# Patient Record
Sex: MALE | URBAN - METROPOLITAN AREA
[De-identification: names, ages, dates, MRNs, and addresses within clinical notes are randomized per-mention and may not be internally consistent; named-entity substitution may affect disease eponyms.]

---

## 2022-09-28 ENCOUNTER — TELEPHONE ENCOUNTER (OUTPATIENT)
Dept: URBAN - METROPOLITAN AREA CLINIC 36 | Facility: CLINIC | Age: 62
End: 2022-09-28

## 2022-09-29 ENCOUNTER — OFFICE VISIT (OUTPATIENT)
Dept: URBAN - METROPOLITAN AREA CLINIC 33 | Facility: CLINIC | Age: 62
End: 2022-09-29
Payer: COMMERCIAL

## 2022-09-29 VITALS
BODY MASS INDEX: 32 KG/M2 | SYSTOLIC BLOOD PRESSURE: 125 MMHG | WEIGHT: 228.6 LBS | HEART RATE: 58 BPM | DIASTOLIC BLOOD PRESSURE: 83 MMHG | HEIGHT: 71 IN | OXYGEN SATURATION: 98 %

## 2022-09-29 DIAGNOSIS — R74.01 ELEVATION OF LEVELS OF LIVER TRANSAMINASE LEVELS: ICD-10-CM

## 2022-09-29 DIAGNOSIS — Z86.010 PERSONAL HISTORY OF COLONIC POLYPS: ICD-10-CM

## 2022-09-29 DIAGNOSIS — R13.19 CERVICAL DYSPHAGIA: ICD-10-CM

## 2022-09-29 PROBLEM — 863927004 LIVER FUNCTION TEST INCREASED (FINDING: Status: ACTIVE | Noted: 2022-09-29

## 2022-09-29 PROCEDURE — 99204 OFFICE O/P NEW MOD 45 MIN: CPT | Performed by: INTERNAL MEDICINE

## 2022-09-29 PROCEDURE — 99254 IP/OBS CNSLTJ NEW/EST MOD 60: CPT | Performed by: INTERNAL MEDICINE

## 2022-09-29 RX ORDER — PANTOPRAZOLE SODIUM 40 MG/1
1 TABLET TABLET, DELAYED RELEASE ORAL ONCE A DAY
Status: ACTIVE | COMMUNITY

## 2022-09-29 RX ORDER — NAPHAZOLINE HCL 0.012 %
2 TABLETS DROPS OPHTHALMIC (EYE) ONCE A DAY
Status: ACTIVE | COMMUNITY

## 2022-09-29 RX ORDER — GUSELKUMAB 100 MG/ML
AS DIRECTED INJECTION SUBCUTANEOUS
Status: ACTIVE | COMMUNITY

## 2022-09-29 RX ORDER — LEVOTHYROXINE SODIUM 125 UG/1
1 TABLET IN THE MORNING ON AN EMPTY STOMACH TABLET ORAL ONCE A DAY
Status: ACTIVE | COMMUNITY

## 2022-09-29 NOTE — HPI-SURVEILLANCE COLONOSCOPY
61 year old male patient whom presents today for consultation for a surveillance colonoscopy. He has a personal history of colonic polyps and his ast colonoscopy was 06.2021 by Dr. Farley. Patient states biospies showed Adenomatous Polyp.  Currently, having 1 bowel movement a day. Stools varies bewteen hard/normal without the presence of blood, mucus, and melena. Patient denies family history of colon cancer/polyps.

## 2022-09-29 NOTE — HPI-DYSPHAGIA
Patient presents today for consultation about dysphagia.  Onset started about 7yrs ago and episodes occur when taking larger pill.  Dysphagia occurs only with larger medication pills, he able to swallow solids food and liquids without any issues. Patient admits having previous EGD or Barium Swallow completed.   He admits having a hernia.  Denies a family history of esophageal and gastric cancers and/or diseases. .

## 2022-09-29 NOTE — PHYSICAL EXAM GASTROINTESTINAL
Abdomen , soft, nontender, nondistended , no guarding or rigidity , no masses palpable , normal bowel sounds , Liver and Spleen,  no hepatosplenomegaly , liver nontender. Ventral hernia

## 2022-09-29 NOTE — HPI-ELEVATED LIVER ENZYMES
61 year old male patient presents today for consultation about recent elevated liver enzymes. Patient admits a history of elevated liver enzymes. Most recent labs were performed on 05.10.2022, ordered by Dr. Salguero, with findings of: Alk Phosphatase: 183 and ALT: 63 all other liver enzymes were in normal range.   He had labs completed by Dr. Howe back in 01.22.2021 which normal range of his liver enzymes.  Patient currently denies any symptoms of jaundice, chills, RUQ palins, dizziness, easy bruising, fatigue.   RISK FACTORS: Denies: Drugs, travel outside the [place], NSAIDs, protein supplements, tattoos, piercings,  service, blood transfusion, family history of liver disease or cancer, personal exposure to liver diseases, penitentiary, rehab. He drinks socially

## 2022-10-15 LAB
A/G RATIO: 1.5
ACTIN (SMOOTH MUSCLE) ANTIBODY (IGG): <20
ALBUMIN: 4.1
ALKALINE PHOSPHATASE: 137
ALPHA-1-ANTITRYPSIN (AAT) PHENOTYPE: (no result)
ALT (SGPT): 32
ANA SCREEN, IFA: NEGATIVE
AST (SGOT): 22
BILIRUBIN, TOTAL: 0.9
BUN/CREATININE RATIO: (no result)
BUN: 18
CALCIUM: 10.4
CARBON DIOXIDE, TOTAL: 21
CERULOPLASMIN: 24
CHLORIDE: 110
CREATININE: 0.92
EGFR: 96
FERRITIN, SERUM: 89
GLOBULIN, TOTAL: 2.8
GLUCOSE: 85
HEP A AB, IGM: (no result)
HEP B CORE AB, IGM: (no result)
HEPATITIS A AB, TOTAL: (no result)
HEPATITIS B CORE AB TOTAL: (no result)
HEPATITIS B SURFACE AB IMMUNITY, QN: <5
HEPATITIS B SURFACE ANTIGEN: (no result)
HEPATITIS C ANTIBODY: (no result)
HEREDITARY HEMOCHROMATOSIS DNA MUT: (no result)
IMMUNOGLOBULIN A: 398
IMMUNOGLOBULIN G: 1139
IMMUNOGLOBULIN M: 53
INDEX: 0.02
IRON BIND.CAP.(TIBC): 344
IRON SATURATION: 42
IRON: 145
LKM-1 ANTIBODY (IGG): <=20
MITOCHONDRIAL (M2) ANTIBODY: <=20
POTASSIUM: 4.4
PROTEIN, TOTAL: 6.9
SODIUM: 142
T-TRANSGLUTAMINASE (TTG) IGA: <1

## 2022-10-27 ENCOUNTER — OFFICE VISIT (OUTPATIENT)
Dept: URBAN - METROPOLITAN AREA CLINIC 33 | Facility: CLINIC | Age: 62
End: 2022-10-27
Payer: COMMERCIAL

## 2022-10-27 ENCOUNTER — DASHBOARD ENCOUNTERS (OUTPATIENT)
Age: 62
End: 2022-10-27

## 2022-10-27 VITALS
OXYGEN SATURATION: 95 % | BODY MASS INDEX: 31.5 KG/M2 | HEART RATE: 63 BPM | DIASTOLIC BLOOD PRESSURE: 80 MMHG | SYSTOLIC BLOOD PRESSURE: 118 MMHG | WEIGHT: 225 LBS | HEIGHT: 71 IN

## 2022-10-27 DIAGNOSIS — Z86.010 PERSONAL HISTORY OF COLONIC POLYPS: ICD-10-CM

## 2022-10-27 DIAGNOSIS — R74.01 ELEVATION OF LEVELS OF LIVER TRANSAMINASE LEVELS: ICD-10-CM

## 2022-10-27 DIAGNOSIS — R13.10 DYSPHAGIA, UNSPECIFIED: ICD-10-CM

## 2022-10-27 PROBLEM — 428283002 HISTORY OF POLYP OF COLON (SITUATION): Status: ACTIVE | Noted: 2022-09-29

## 2022-10-27 PROBLEM — 40739000 DYSPHAGIA: Status: ACTIVE | Noted: 2022-09-29

## 2022-10-27 PROCEDURE — 99214 OFFICE O/P EST MOD 30 MIN: CPT | Performed by: INTERNAL MEDICINE

## 2022-10-27 RX ORDER — PANTOPRAZOLE SODIUM 40 MG/1
1 TABLET TABLET, DELAYED RELEASE ORAL ONCE A DAY
OUTPATIENT

## 2022-10-27 RX ORDER — GUSELKUMAB 100 MG/ML
AS DIRECTED INJECTION SUBCUTANEOUS
Status: ACTIVE | COMMUNITY

## 2022-10-27 RX ORDER — LEVOTHYROXINE SODIUM 125 UG/1
1 TABLET IN THE MORNING ON AN EMPTY STOMACH TABLET ORAL ONCE A DAY
Status: ACTIVE | COMMUNITY

## 2022-10-27 RX ORDER — NAPHAZOLINE HCL 0.012 %
2 TABLETS DROPS OPHTHALMIC (EYE) ONCE A DAY
Status: ACTIVE | COMMUNITY

## 2022-10-27 RX ORDER — PANTOPRAZOLE SODIUM 40 MG/1
1 TABLET TABLET, DELAYED RELEASE ORAL ONCE A DAY
Status: ACTIVE | COMMUNITY

## 2022-10-27 NOTE — HPI-ELEVATED LIVER ENZYMES
Male patient presents today for a 4-week follow up of his Elevated Liver Enzymes.  Most recent labs were performed 10/5/2022.   Patient currently denies any symptoms of jaundice, chills, RUQ pains, dizziness, easy bruising, fatigue. He admits increased fatigue but attributes it to lingering side effects of CoVid.   RISK FACTORS: Denies: Drugs, travel outside the [place], NSAIDs, protein supplements, tattoos, piercings,  service, blood transfusion, family history of liver disease or cancer, personal exposure to liver diseases, [facility], rehab. He drinks socially  Last Visit  (9-) 61 year old male patient presents today for consultation about recent elevated liver enzymes. Patient admits a history of elevated liver enzymes. Most recent labs were performed on 05.10.2022, ordered by Dr. Salguero, with findings of: Alk Phosphatase: 183 and ALT: 63 all other liver enzymes were in normal range.   He had labs completed by Dr. Howe back in 01.22.2021 which normal range of his liver enzymes.  Patient currently denies any symptoms of jaundice, chills, RUQ palins, dizziness, easy bruising, fatigue.   RISK FACTORS: Denies: Drugs, travel outside the [place], NSAIDs, protein supplements, tattoos, piercings,  service, blood transfusion, family history of liver disease or cancer, personal exposure to liver diseases, [facility], rehab. He drinks socially

## 2022-10-27 NOTE — HPI-DYSPHAGIA
Patient denies improvement of dysphagia symptoms since his last visit. He reports symptoms are the same as they were at his last visit. He admits that any solids that are dry he experience dysphagia.   He admits a persisitent cough that has been lingering since he has had CoVid.   Last Visit  (9-) Patient presents today for consultation about dysphagia.  Onset started about 7yrs ago and episodes occur when taking larger pill.  Dysphagia occurs only with larger medication pills, he is able to swallow solids food and liquids without any issues. Patient admits having previous EGD or Barium Swallow completed.   He admits having a hernia.  Denies a family history of esophageal and gastric cancers and/or diseases. .

## 2023-04-27 ENCOUNTER — OFFICE VISIT (OUTPATIENT)
Dept: URBAN - METROPOLITAN AREA CLINIC 33 | Facility: CLINIC | Age: 63
End: 2023-04-27

## 2023-04-27 RX ORDER — PANTOPRAZOLE SODIUM 40 MG/1
1 TABLET TABLET, DELAYED RELEASE ORAL ONCE A DAY
OUTPATIENT

## 2023-04-27 NOTE — HPI-ELEVATED LIVER ENZYMES
Patient presents today for a follow up of elevated liver enzymes. He admits/denies any jaundice, fatigue, dizziness, RUQ pain, bloating, easy bruising or chills. No recent labs were performed .   Last office visit 10/27/2022 Male patient presents today for a 4-week follow up of his Elevated Liver Enzymes.  Most recent labs were performed 10/5/2022.   Patient currently denies any symptoms of jaundice, chills, RUQ pains, dizziness, easy bruising, fatigue. He admits increased fatigue but attributes it to lingering side effects of CoVid.   RISK FACTORS: Denies: Drugs, travel outside the [place], NSAIDs, protein supplements, tattoos, piercings,  service, blood transfusion, family history of liver disease or cancer, personal exposure to liver diseases, [facility], rehab. He drinks socially

## 2023-04-27 NOTE — HPI-DYSPHAGIA
Patient presents today for follow up about dysphagia. He admits/denies symptoms are the same as they were at his last visit. He admits/denies that any solids that are dry he experience dysphagia. Patient admits/denies symptoms are more present during/after --.    Last  office visit 10/27/22 Patient denies improvement of dysphagia symptoms since his last visit. He reports symptoms are the same as they were at his last visit. He admits that any solids that are dry he experience dysphagia.   He admits a persisitent cough that has been lingering since he has had CoVid.